# Patient Record
Sex: FEMALE | NOT HISPANIC OR LATINO | URBAN - METROPOLITAN AREA
[De-identification: names, ages, dates, MRNs, and addresses within clinical notes are randomized per-mention and may not be internally consistent; named-entity substitution may affect disease eponyms.]

---

## 2020-11-09 ENCOUNTER — IMPORTED ENCOUNTER (OUTPATIENT)
Dept: URBAN - METROPOLITAN AREA CLINIC 1 | Facility: CLINIC | Age: 55
End: 2020-11-09

## 2020-11-09 PROBLEM — H52.4: Noted: 2020-11-09

## 2020-11-09 PROBLEM — H52.03: Noted: 2020-11-09

## 2020-11-09 PROBLEM — H52.222: Noted: 2020-11-09

## 2020-11-09 PROCEDURE — S0620 ROUTINE OPHTHALMOLOGICAL EXA: HCPCS

## 2020-11-09 NOTE — PATIENT DISCUSSION
1.  Hyperopia w/ Astigmatism OS -- Rx was given for corrective spectacles if indicated. 2.  Presbyopia 3. Nuclear Cataracts OU -- Observe. 4.  Dry Eyes OU -- Recommended the use of ATs BID OU routinely. 5.  Macular Drusen OU -- Observe. Recommended to come back for medical exam (Handout for AREDS II vitamin given). Return for an appointment in 6 months 27 with Dr. Lisset Hudson.

## 2021-05-10 ENCOUNTER — IMPORTED ENCOUNTER (OUTPATIENT)
Dept: URBAN - METROPOLITAN AREA CLINIC 1 | Facility: CLINIC | Age: 56
End: 2021-05-10

## 2021-05-10 PROBLEM — H35.363: Noted: 2021-05-10

## 2021-05-10 PROBLEM — H25.13: Noted: 2021-05-10

## 2021-05-10 PROBLEM — H04.123: Noted: 2021-05-10

## 2021-05-10 PROCEDURE — 99214 OFFICE O/P EST MOD 30 MIN: CPT

## 2021-05-10 NOTE — PATIENT DISCUSSION
1.  Macular Drusen OU- Observe benign. Recommended to take AREDS II vitamins and look at [address] daily [address] given). () Family history of Macular Degeneration (Maternal Aunt)2. NS Cataract OU- Observe for now without intervention. The patient was advised to contact us if any change or worsening of vision3. Dry Eyes OU- Recommend the frequent use of AT's BID OU routinely. Patient deferred Manifest Rx today comes back under vision planReturn for an appointment in 6 month 36 with Dr. Marge Norwood. Return for an appointment in 1 year 27 with Dr. Marge Norwood.

## 2021-11-10 ENCOUNTER — IMPORTED ENCOUNTER (OUTPATIENT)
Dept: URBAN - METROPOLITAN AREA CLINIC 1 | Facility: CLINIC | Age: 56
End: 2021-11-10

## 2021-11-10 PROBLEM — H52.03: Noted: 2021-11-10

## 2021-11-10 PROBLEM — H52.4: Noted: 2021-11-10

## 2021-11-10 PROBLEM — H52.222: Noted: 2021-11-10

## 2021-11-10 PROCEDURE — S0621 ROUTINE OPHTHALMOLOGICAL EXA: HCPCS

## 2021-11-10 NOTE — PATIENT DISCUSSION
1.  Hyperopia w/ Astigmatism OS -- Rx was given for corrective spectacles if indicated. 2.  Presbyopia 3. Macular Drusen OU -- Observe benign. Recommended to take AREDS II vitamins and look at [address] daily () Family Hx ARMD (Maternal Aunt)4. Nuclear Cataract OU -- Observe. 5. Dry Eyes OU -- Cont ATs BID OU routinely. 6.  Dermatochlasis OU UL's -- Follow with no intervention at this time. Return for an appointment as scheduled (5.11.22 - 27) with Dr. Sameera Allen. Return for an appointment in 1 year 36 with Dr. Sameera Allen.

## 2022-04-02 ASSESSMENT — VISUAL ACUITY
OS_SC: 20/30
OS_CC: J2
OS_SC: 20/25
OS_SC: 20/25
OD_SC: 20/40
OD_CC: J2
OD_SC: 20/25-2
OS_CC: J1
OS_CC: 20/40
OD_CC: J1
OD_SC: 20/30-1
OS_GLARE: 20/70
OD_CC: 20/150
OD_GLARE: 20/70

## 2022-04-02 ASSESSMENT — TONOMETRY
OD_IOP_MMHG: 15
OS_IOP_MMHG: 14
OD_IOP_MMHG: 14
OS_IOP_MMHG: 14
OS_IOP_MMHG: 15
OD_IOP_MMHG: 15